# Patient Record
Sex: MALE | Race: WHITE | NOT HISPANIC OR LATINO | Employment: FULL TIME | URBAN - METROPOLITAN AREA
[De-identification: names, ages, dates, MRNs, and addresses within clinical notes are randomized per-mention and may not be internally consistent; named-entity substitution may affect disease eponyms.]

---

## 2019-08-27 ENCOUNTER — OFFICE VISIT (OUTPATIENT)
Dept: URGENT CARE | Facility: CLINIC | Age: 39
End: 2019-08-27
Payer: COMMERCIAL

## 2019-08-27 VITALS
DIASTOLIC BLOOD PRESSURE: 85 MMHG | WEIGHT: 236.6 LBS | BODY MASS INDEX: 29.42 KG/M2 | HEART RATE: 70 BPM | TEMPERATURE: 99.4 F | SYSTOLIC BLOOD PRESSURE: 122 MMHG | HEIGHT: 75 IN | OXYGEN SATURATION: 98 % | RESPIRATION RATE: 20 BRPM

## 2019-08-27 DIAGNOSIS — M25.519 ARTHRALGIA OF SHOULDER REGION, UNSPECIFIED LATERALITY: Primary | ICD-10-CM

## 2019-08-27 PROCEDURE — 99203 OFFICE O/P NEW LOW 30 MIN: CPT | Performed by: PREVENTIVE MEDICINE

## 2019-08-27 RX ORDER — RIZATRIPTAN BENZOATE 10 MG/1
10 TABLET, ORALLY DISINTEGRATING ORAL ONCE AS NEEDED
COMMUNITY

## 2019-08-27 NOTE — PROGRESS NOTES
[address]        NAME: Jenni Dunlap is a 46 y o  male  : 1980    MRN: [id number]  DATE: 2019  TIME: 6:15 PM    Assessment and Plan   Arthralgia of shoulder region, unspecified laterality [M25 [id number]]  1  Arthralgia of shoulder region, unspecified laterality           Patient Instructions       Follow up with PCP in 3-5 days  Proceed to  ER if symptoms worsen  Chief Complaint     Chief Complaint   Patient presents with    Back Pain     patient reports the last 2 weeks he has had bilateral shoulder, back and left elbow pain  Denies fever, chills or rash noted  reports he is fatigued  History of Present Illness       For the past 2 weeks he has had on and off bilateral shoulder pains some elbow pains particularly in the left elbow  Denies any other issues such as rash, cardiorespiratory  or GI symptomatology  Denies joint that are swollen red or warm  He denies fever  Past history of stage IV Hodgkin's which, by history, was cured in   Review of Systems   Review of Systems   Constitutional: Positive for fatigue  Respiratory: Negative  Cardiovascular: Negative  Gastrointestinal: Negative  Musculoskeletal: Positive for arthralgias  Negative for joint swelling  Current Medications       Current Outpatient Medications:     rizatriptan (MAXALT-MLT) 10 MG disintegrating tablet, Take 10 mg by mouth once as needed for migraine May repeat in 2 hours if needed, Disp: , Rfl:     Current Allergies     Allergies as of 2019    (No Known Allergies)            The following portions of the patient's history were reviewed and updated as appropriate: allergies, current medications, past family history, past medical history, past social history, past surgical history and problem list      Past Medical History:   Diagnosis Date    Hodgkin's disease with nodular sclerosis, stage IVB ([facility] )     Migraines        No past surgical history on file      No family history on file  Medications have been verified  Objective   /85   Pulse 70   Temp 99 4 °F (37 4 °C)   Resp 20   Ht 6' 3" (1 905 m)   Wt 107 kg (236 lb 9 6 oz)   SpO2 98%   BMI 29 57 kg/m²        Physical Exam     Physical Exam   HENT:   Right Ear: External ear normal    Left Ear: External ear normal    Mouth/Throat: Oropharynx is clear and moist    Eyes: Conjunctivae are normal  No scleral icterus  Neck: Normal range of motion  No thyromegaly present  Cardiovascular: Normal heart sounds  Pulmonary/Chest: Breath sounds normal    Abdominal: Soft  He exhibits no distension and no mass  There is no tenderness  No splenomegaly or hepatomegaly  Musculoskeletal: Normal range of motion  Lymphadenopathy:     He has no cervical adenopathy

## 2019-08-27 NOTE — PATIENT INSTRUCTIONS
At the present time I cannot find a cause for your joint aches  Perhaps at a viral illness  I would take Motrin for the joint aches  If you're not improved in 7-10 days I would see my family doctor for further workup

## 2021-03-09 ENCOUNTER — CONSULT (OUTPATIENT)
Dept: PAIN MEDICINE | Facility: CLINIC | Age: 41
End: 2021-03-09
Payer: COMMERCIAL

## 2021-03-09 VITALS
HEIGHT: 74 IN | HEART RATE: 70 BPM | WEIGHT: 230 LBS | DIASTOLIC BLOOD PRESSURE: 78 MMHG | BODY MASS INDEX: 29.52 KG/M2 | TEMPERATURE: 97 F | SYSTOLIC BLOOD PRESSURE: 128 MMHG

## 2021-03-09 DIAGNOSIS — M51.26 LUMBAR DISC HERNIATION: Primary | ICD-10-CM

## 2021-03-09 DIAGNOSIS — M54.16 LUMBAR RADICULOPATHY: ICD-10-CM

## 2021-03-09 PROBLEM — R53.82 CHRONIC FATIGUE SYNDROME: Status: ACTIVE | Noted: 2021-03-09

## 2021-03-09 PROBLEM — G43.909 MIGRAINE: Status: ACTIVE | Noted: 2021-03-09

## 2021-03-09 PROBLEM — F43.10 POSTTRAUMATIC STRESS DISORDER: Status: ACTIVE | Noted: 2021-03-09

## 2021-03-09 PROBLEM — G93.32 CHRONIC FATIGUE SYNDROME: Status: ACTIVE | Noted: 2021-03-09

## 2021-03-09 PROBLEM — Z85.71 HISTORY OF HODGKIN'S LYMPHOMA: Status: ACTIVE | Noted: 2021-03-09

## 2021-03-09 PROBLEM — J30.2 SEASONAL ALLERGIES: Status: ACTIVE | Noted: 2021-03-09

## 2021-03-09 PROCEDURE — 99244 OFF/OP CNSLTJ NEW/EST MOD 40: CPT | Performed by: ANESTHESIOLOGY

## 2021-03-09 RX ORDER — TIZANIDINE HYDROCHLORIDE 4 MG/1
4 CAPSULE, GELATIN COATED ORAL 3 TIMES DAILY
Qty: 45 CAPSULE | Refills: 0 | Status: SHIPPED | OUTPATIENT
Start: 2021-03-09
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
## 2021-03-09 NOTE — PATIENT INSTRUCTIONS
Epidural Steroid Injection   AMBULATORY CARE:   What you need to know about an epidural steroid injection (JAE):  An JAE is a procedure to inject steroid medicine into the epidural space  The epidural space is between your spinal cord and vertebrae  Steroids reduce inflammation and fluid buildup in your spine that may be causing pain  You may be given pain medicine along with the steroids  How to prepare for an JAE:  Your healthcare provider will talk to you about how to prepare for your procedure  He or she will tell you what medicines to take or not take on the day of your procedure  You may need to stop taking blood thinners or other medicines several days before your procedure  You may need to adjust any diabetes medicine you take on the day of your procedure  Steroid medicine can increase your blood sugar level  Arrange for someone to drive you home when you are discharged  What will happen during an JAE:   · You will be given medicine to numb the procedure area  You will be awake for the procedure, but you will not feel pain  You may also be given medicine to help you relax  Contrast liquid will be used to help your healthcare provider see the area better  Tell the healthcare provider if you have ever had an allergic reaction to contrast liquid  · Your healthcare provider may place the needle into your neck area, middle of your back, or tailbone area  He may inject the medicine next to the nerves that are causing your pain  He may instead inject the medicine into a larger area of the epidural space  This helps the medicine spread to more nerves  Your healthcare provider will use a fluoroscope to help guide the needle to the right place  A fluoroscope is a type of x-ray  After the procedure, a bandage will be placed over the injection site to prevent infection  What will happen after an JAE:  You will have a bandage over the injection site to prevent infection   Your healthcare provider will tell you when you can bathe and any activity guidelines  You will be able to go home  Risks of an JAE:  You may have temporary or permanent nerve damage or paralysis  You may have bleeding or develop a serious infection, such as meningitis (swelling of the brain coverings)  An abscess may also develop  An abscess is a pus-filled area under the skin  You may need surgery to fix the abscess  You may have a seizure, anxiety, or trouble sleeping  If you are a man, you may have temporary erectile dysfunction (not able to have an erection)  Call your local emergency number (911 in the [address] [place] [address],[address]) if:   · You have a seizure  · You have trouble moving your legs  Seek care immediately if:   · Blood soaks through your bandage  · You have a fever or chills, severe back pain, and the procedure area is sensitive to the touch  · You cannot control when you urinate or have a bowel movement  Call your doctor if:   · You have weakness or numbness in your legs  · Your wound is red, swollen, or draining pus  · You have nausea or are vomiting  · Your face or neck is red and you feel warm  · You have more pain than you had before the procedure  · You have swelling in your hands or feet  · You have questions or concerns about your condition or care  Care for your wound as directed: You may remove the bandage before you go to bed the day of your procedure  You may take a shower, but do not take a bath for at least 24 hours  Self-care:   · Do not drive,  use machines, or do strenuous activity for 24 hours after your procedure or as directed  · Continue other treatments  as directed  Steroid injections alone will not control your pain  The injections are meant to be used with other treatments, such as physical therapy  Follow up with your doctor as directed:  Write down your questions so you remember to ask them during your visits     © Copyright [employer] 2020 Information is for End User's use only and may not be sold, redistributed or otherwise used for commercial purposes  All illustrations and images included in CareNotes® are the copyrighted property of A D A M [employer] Inc  or [employer]lam[employer]  The above information is an  only  It is not intended as medical advice for individual conditions or treatments  Talk to your doctor, nurse or pharmacist before following any medical regimen to see if it is safe and effective for you

## 2021-03-12 ENCOUNTER — HOSPITAL ENCOUNTER (OUTPATIENT)
Dept: RADIOLOGY | Facility: CLINIC | Age: 41
Discharge: HOME | End: 2021-03-12
Attending: ANESTHESIOLOGY | Admitting: ANESTHESIOLOGY
Payer: COMMERCIAL

## 2021-03-12 VITALS
DIASTOLIC BLOOD PRESSURE: 76 MMHG | RESPIRATION RATE: 20 BRPM | OXYGEN SATURATION: 98 % | HEART RATE: 60 BPM | SYSTOLIC BLOOD PRESSURE: 118 MMHG | TEMPERATURE: 98.1 F

## 2021-03-12 DIAGNOSIS — M51.26 LUMBAR DISC HERNIATION: ICD-10-CM

## 2021-03-12 DIAGNOSIS — M54.16 LUMBAR RADICULOPATHY: ICD-10-CM

## 2021-03-12 PROCEDURE — 62323 NJX INTERLAMINAR LMBR/SAC: CPT | Performed by: ANESTHESIOLOGY

## 2021-03-12 RX ORDER — METHYLPREDNISOLONE ACETATE 80 MG/ML
160 INJECTION, SUSPENSION INTRA-ARTICULAR; INTRALESIONAL; INTRAMUSCULAR; PARENTERAL; SOFT TISSUE ONCE
Status: COMPLETED | OUTPATIENT
Start: 2021-03-12 | End: 2021-03-12

## 2021-03-12 RX ADMIN — IOHEXOL 1 ML: 300 INJECTION, SOLUTION INTRAVENOUS at 15:08

## 2021-03-12 RX ADMIN — METHYLPREDNISOLONE ACETATE 160 MG: 80 INJECTION, SUSPENSION INTRA-ARTICULAR; INTRALESIONAL; INTRAMUSCULAR; SOFT TISSUE at 15:09

## 2021-03-12 NOTE — H&P
History of Present Illness: The patient is a 37 y o  male who presents with complaints of  Low back and leg pain  Patient Active Problem List   Diagnosis    Bleomycin toxicity    Chronic fatigue syndrome    History of Hodgkin's lymphoma    Hodgkin lymphoma, nodular sclerosis (HCC)    Migraine    Posttraumatic stress disorder    Seasonal allergies    Lumbar disc herniation    Lumbar radiculopathy       Past Medical History:   Diagnosis Date    [facility]     Hodgkin's disease with nodular sclerosis, stage IVB (HCC)     Migraines     Partial hearing loss     [facility]traumatic [facility] injury) ([facility] )        No past surgical history on file  Current Outpatient Medications:     rizatriptan (MAXALT-MLT) 10 MG disintegrating tablet, Take 10 mg by mouth once as needed for migraine May repeat in 2 hours if needed, Disp: , Rfl:     TiZANidine (ZANAFLEX) 4 MG capsule, Take 1 capsule (4 mg total) by mouth 3 (three) times a day, Disp: 45 capsule, Rfl: 0    Current Facility-Administered Medications:     iohexol (OMNIPAQUE) 300 mg/mL injection 50 mL, 50 mL, Epidural, Once, Josué Wang DO    methylPREDNISolone acetate (DEPO-MEDROL) injection 160 mg, 160 mg, Epidural, Once, Josué Wang DO    No Known Allergies    Physical Exam:   General: Awake, Alert, Oriented x 3, Mood and affect appropriate  Respiratory: Respirations even and unlabored  Cardiovascular: Peripheral pulses intact; no edema  Musculoskeletal Exam:  Normal gait and station    ASA Score: II         Assessment:   1  Lumbar disc herniation    2   Lumbar radiculopathy        Plan: LESI to the right 1

## 2021-03-12 NOTE — DISCHARGE INSTRUCTIONS
Epidural Steroid Injection   WHAT YOU NEED TO KNOW:   An epidural steroid injection (JAE) is a procedure to inject steroid medicine into the epidural space  The epidural space is between your spinal cord and vertebrae  Steroids reduce inflammation and fluid buildup in your spine that may be causing pain  You may be given pain medicine along with the steroids  ACTIVITY  · Do not drive or operate machinery today  · No strenuous activity today - bending, lifting, etc   · You may resume normal activites starting tomorrow - start slowly and as tolerated  · You may shower today, but no tub baths or hot tubs  · You may have numbness for several hours from the local anesthetic  Please use caution and common sense, especially with weight-bearing activities  CARE OF THE INJECTION SITE  · If you have soreness or pain, apply ice to the area today (20 minutes on/20 minutes off)  · Starting tomorrow, you may use warm, moist heat or ice if needed  · You may have an increase or change in your discomfort for 36-48 hours after your treatment  · Apply ice and continue with any pain medication you have been prescribed  · [facility]ify [facility] if you have any of the following: redness, drainage, swelling, headache, stiff neck or fever above 100°F     SPECIAL INSTRUCTIONS  · Our office will contact you in approximately 7 days for a progress report  MEDICATIONS  · Continue to take all routine medications  · Our office may have instructed you to hold some medications  If you have a problem specifically related to your procedure, please call our office at [phone]  Problems not related to your procedure should be directed to your primary care physician

## 2021-03-19 ENCOUNTER — TELEPHONE (OUTPATIENT)
Dept: PAIN MEDICINE | Facility: CLINIC | Age: 41
End: 2021-03-19

## 2021-03-19 DIAGNOSIS — M51.26 LUMBAR DISC HERNIATION: ICD-10-CM

## 2021-03-19 DIAGNOSIS — M54.16 LUMBAR RADICULOPATHY: ICD-10-CM

## 2021-03-19 RX ORDER — TIZANIDINE HYDROCHLORIDE 4 MG/1
4 CAPSULE, GELATIN COATED ORAL 3 TIMES DAILY
Qty: 45 CAPSULE | Refills: 0 | OUTPATIENT
Start: 2021-03-19

## 2021-03-19 NOTE — TELEPHONE ENCOUNTER
Pt reports 50% improvement post inj   Pain level 4/10       S/p LESI to RT #1 3/12, 4/2 LESI #2     Patient would like a call to see when the next available is for his 2nd injection because he might need to r/s

## 2021-03-26 ENCOUNTER — HOSPITAL ENCOUNTER (OUTPATIENT)
Dept: RADIOLOGY | Facility: CLINIC | Age: 41
Discharge: HOME | End: 2021-03-26
Attending: ANESTHESIOLOGY | Admitting: ANESTHESIOLOGY
Payer: COMMERCIAL

## 2021-03-26 VITALS
SYSTOLIC BLOOD PRESSURE: 118 MMHG | DIASTOLIC BLOOD PRESSURE: 75 MMHG | HEART RATE: 69 BPM | OXYGEN SATURATION: 96 % | RESPIRATION RATE: 20 BRPM | TEMPERATURE: 97.3 F

## 2021-03-26 DIAGNOSIS — M54.16 LUMBAR RADICULOPATHY: ICD-10-CM

## 2021-03-26 DIAGNOSIS — M51.26 LUMBAR DISC HERNIATION: ICD-10-CM

## 2021-03-26 PROCEDURE — 62323 NJX INTERLAMINAR LMBR/SAC: CPT | Performed by: ANESTHESIOLOGY

## 2021-03-26 RX ORDER — METHYLPREDNISOLONE ACETATE 80 MG/ML
80 INJECTION, SUSPENSION INTRA-ARTICULAR; INTRALESIONAL; INTRAMUSCULAR; PARENTERAL; SOFT TISSUE ONCE
Status: COMPLETED | OUTPATIENT
Start: 2021-03-26 | End: 2021-03-26

## 2021-03-26 RX ADMIN — IOHEXOL 1 ML: 300 INJECTION, SOLUTION INTRAVENOUS at 14:21

## 2021-03-26 RX ADMIN — METHYLPREDNISOLONE ACETATE 80 MG: 80 INJECTION, SUSPENSION INTRA-ARTICULAR; INTRALESIONAL; INTRAMUSCULAR; SOFT TISSUE at 14:22

## 2021-03-26 NOTE — DISCHARGE INSTRUCTIONS
Epidural Steroid Injection   WHAT YOU NEED TO KNOW:   An epidural steroid injection (JAE) is a procedure to inject steroid medicine into the epidural space  The epidural space is between your spinal cord and vertebrae  Steroids reduce inflammation and fluid buildup in your spine that may be causing pain  You may be given pain medicine along with the steroids  ACTIVITY  · Do not drive or operate machinery today  · No strenuous activity today - bending, lifting, etc   · You may resume normal activites starting tomorrow - start slowly and as tolerated  · You may shower today, but no tub baths or hot tubs  · You may have numbness for several hours from the local anesthetic  Please use caution and common sense, especially with weight-bearing activities  CARE OF THE INJECTION SITE  · If you have soreness or pain, apply ice to the area today (20 minutes on/20 minutes off)  · Starting tomorrow, you may use warm, moist heat or ice if needed  · You may have an increase or change in your discomfort for 36-48 hours after your treatment  · Apply ice and continue with any pain medication you have been prescribed  · Notify the [facility] if you have any of the following: redness, drainage, swelling, headache, stiff neck or fever above 100°F     SPECIAL INSTRUCTIONS  · Our office will contact you in approximately 7 days for a progress report  MEDICATIONS  · Continue to take all routine medications  · Our office may have instructed you to hold some medications  As no general anesthesia was used in today's procedure, you should not experience any side effects related to anesthesia  If you have a problem specifically related to your procedure, please call our office at [phone]  Problems not related to your procedure should be directed to your primary care physician

## 2021-03-26 NOTE — H&P
History of Present Illness: The patient is a 37 y o  male who presents with complaints of   Low back and leg pain  Patient Active Problem List   Diagnosis    Bleomycin toxicity    Chronic fatigue syndrome    History of Hodgkin's lymphoma    Hodgkin lymphoma, nodular sclerosis (HCC)    Migraine    Posttraumatic stress disorder    Seasonal allergies    Lumbar disc herniation    Lumbar radiculopathy       Past Medical History:   Diagnosis Date    [facility]     Hodgkin's disease with nodular sclerosis, stage IVB (HCC)     Migraines     Partial hearing loss     [facility]traumatic [facility] injury) ([facility] )        No past surgical history on file  Current Outpatient Medications:     rizatriptan (MAXALT-MLT) 10 MG disintegrating tablet, Take 10 mg by mouth once as needed for migraine May repeat in 2 hours if needed, Disp: , Rfl:     TiZANidine (ZANAFLEX) 4 MG capsule, Take 1 capsule (4 mg total) by mouth 3 (three) times a day, Disp: 45 capsule, Rfl: 0    Current Facility-Administered Medications:     iohexol (OMNIPAQUE) 300 mg/mL injection 50 mL, 50 mL, Epidural, Once, Josué Sequeiraev, DO    methylPREDNISolone acetate (DEPO-MEDROL) injection 80 mg, 80 mg, Epidural, Once, Josué Wang, DO    No Known Allergies    Physical Exam:   Vitals:    03/26/21 1415   BP: 125/76   Pulse: 66   Resp: 20   Temp: (!) 97 3 °F (36 3 °C)   SpO2: 95%     General: Awake, Alert, Oriented x 3, Mood and affect appropriate  Respiratory: Respirations even and unlabored  Cardiovascular: Peripheral pulses intact; no edema  Musculoskeletal Exam:   Decreased range of motion lumbar spine    ASA Score: II    Patient/Chart Verification  Patient ID Verified: Verbal  ID Band Applied: No  Consents Confirmed: Procedural  H&P( within 30 days) Verified: To be obtained in the Pre-Procedure area  Interval H&P(within 24 hr) Complete (required for Outpatients and Surgery Admit only):  To be obtained in the Pre-Procedure area  Allergies Reviewed: Yes  Anticoag/NSAID held?: NA  Currently on antibiotics?: No    Assessment:   1  Lumbar disc herniation    2   Lumbar radiculopathy        Plan:  RAMOS

## 2021-04-02 ENCOUNTER — TELEPHONE (OUTPATIENT)
Dept: PAIN MEDICINE | Facility: CLINIC | Age: 41
End: 2021-04-02

## 2021-04-19 ENCOUNTER — TELEMEDICINE (OUTPATIENT)
Dept: PAIN MEDICINE | Facility: CLINIC | Age: 41
End: 2021-04-19
Payer: COMMERCIAL

## 2021-04-19 DIAGNOSIS — M54.16 LUMBAR RADICULOPATHY: Primary | ICD-10-CM

## 2021-04-19 DIAGNOSIS — M51.26 LUMBAR DISC HERNIATION: ICD-10-CM

## 2021-04-19 PROCEDURE — 99441 PR PHYS/QHP TELEPHONE EVALUATION 5-10 MIN: CPT | Performed by: NURSE PRACTITIONER

## 2021-04-19 NOTE — PATIENT INSTRUCTIONS
Assessment/Plan: 1  I discussed with the patient that since there has been moderate-significant improvement in the pain symptoms that we will hold off on any repeat injections at this point in time  However, I did explain that if over the next 8-12 weeks the pain symptoms should return, worsen, and/or change, we could consider a repeat injection  If after the 12 weeks and OV would be warranted for re-evaluation  The patient was agreeable and verbalized an understanding  2  With regards to chiropractic treatment, explained the patient at this point I feel would be reasonable to restart his chiropractic treatment and advised him to allow pain be his guide  I also advised the patient that he should continue to try to slowly and steadily increase his activity, as tolerated, again, allowing pain be his guide  The patient was agreeable and verbalized an understanding  3  I discussed with the patient that at this point time he can followup with our office on an as-needed basis  I did review the patient that if his pain symptoms should change, worsen, and/or if he would experience any new symptoms he would like to be evaluated for, he should give our office a call  The patient was agreeable and verbalized an understanding

## 2021-04-19 NOTE — PROGRESS NOTES
Virtual Brief Visit    Assessment/Plan: 1  I discussed with the patient that since there has been moderate-significant improvement in the pain symptoms that we will hold off on any repeat injections at this point in time  However, I did explain that if over the next 8-12 weeks the pain symptoms should return, worsen, and/or change, we could consider a repeat injection  If after the 12 weeks and OV would be warranted for re-evaluation  The patient was agreeable and verbalized an understanding  2  With regards to chiropractic treatment, explained the patient at this point I feel would be reasonable to restart his chiropractic treatment and advised him to allow pain be his guide  I also advised the patient that he should continue to try to slowly and steadily increase his activity, as tolerated, again, allowing pain be his guide  The patient was agreeable and verbalized an understanding  3  I discussed with the patient that at this point time he can followup with our office on an as-needed basis  I did review the patient that if his pain symptoms should change, worsen, and/or if he would experience any new symptoms he would like to be evaluated for, he should give our office a call  The patient was agreeable and verbalized an understanding  Problem List Items Addressed This Visit        Nervous and Auditory    Lumbar radiculopathy - Primary       Musculoskeletal and Integument    Lumbar disc herniation                Reason for visit is   Chief Complaint   Patient presents with    Virtual Brief Visit        Encounter provider JAMES Bull    Provider located at [address] [place] [address] E  [place] [address] [address]  [phone]    Recent Visits  No visits were found meeting these conditions     Showing recent visits within past 7 days and meeting all other requirements     Today's Visits  Date Type Provider Dept   04/19/21 Telemedicine JAMES Augustin  [facility]   Showing today's visits and meeting all other requirements     Future Appointments  No visits were found meeting these conditions  Showing future appointments within next 150 days and meeting all other requirements      It was my intent to perform this visit via video technology but the patient was not able to do a video connection so the visit was completed via audio telephone only  After connecting through telephone, the patient was identified by name and date of birth  Gena Weldon was informed that this is a telemedicine visit and that the visit is being conducted through telephone  My office door was closed  No one else was in the room  He acknowledged consent and understanding of privacy and security of the platform  The patient has agreed to participate and understands he can discontinue the visit at any time  Patient is aware this is a billable service  Subjective    Gena Weldon is a 37 y o  male who is currently concerned about the risks of COVID-19  The patient currently denies any of the signs or symptoms of COVID-19, but because of age or other comorbidities, medical history, and/or fear of exposure to COVID-19, the patient preferred to proceed with a virtual visit today  The patient is currently being treated for his low back and lumbar radiculopathy secondary to a disc herniation  He is status post his 2nd L5-S1 interlaminar lumbar epidural steroid injection on March 26, 2021 with Dr Barron Kessler  He reports that there is at least 75% overall improvement in his pain symptoms as he does have a chronic dull aching pain, however, the pain is minimal and manageable and even at the end of a busy work day his pain is not significantly flared up and even this past weekend he had to move some 80 lb bags of concrete and was able to do so without significant pain the next day    The patient reports that he tries to use his legs and lift properly to prevent any further issues or pain  He want to know if it would be okay to go back to his chiropractor to also help with maintenance and manage his pain is well  Presently he rates his pain as a 4/10  HPI     Past Medical History:   Diagnosis Date    Cancer ([facility] )     Hodgkin's disease with nodular sclerosis, stage IVB ([facility] )     Migraines     Partial hearing loss     TBI (traumatic brain injury) ([facility] )        No past surgical history on file  Current Outpatient Medications   Medication Sig Dispense Refill    rizatriptan (MAXALT-MLT) 10 MG disintegrating tablet Take 10 mg by mouth once as needed for migraine May repeat in 2 hours if needed      TiZANidine (ZANAFLEX) 4 MG capsule Take 1 capsule (4 mg total) by mouth 3 (three) times a day 45 capsule 0     No current facility-administered medications for this visit  No Known Allergies    Review of Systems   Musculoskeletal: Positive for back pain  All other systems reviewed and are negative  There were no vitals filed for this visit  I spent 7 minutes discussing the patients past medical/surgical history, current pain symptoms, and medication regiment/treatment plan with the patient today during the virtual visit  Normal OV: [id number]    VIRTUAL VISIT DISCLAIMER    Dario Adam acknowledges that he has consented to an online visit or consultation  He understands that the online visit is based solely on information provided by him, and that, in the absence of a face-to-face physical evaluation by the physician, the diagnosis he receives is both limited and provisional in terms of accuracy and completeness  This is not intended to replace a full medical face-to-face evaluation by the physician  Dario Adam understands and accepts these terms

## 2022-05-23 ENCOUNTER — CONSULT (OUTPATIENT)
Dept: SURGERY | Facility: HOSPITAL | Age: 42
End: 2022-05-23
Payer: COMMERCIAL

## 2022-05-23 VITALS
DIASTOLIC BLOOD PRESSURE: 81 MMHG | SYSTOLIC BLOOD PRESSURE: 122 MMHG | WEIGHT: 244.4 LBS | HEIGHT: 75 IN | TEMPERATURE: 97.7 F | HEART RATE: 59 BPM | BODY MASS INDEX: 30.39 KG/M2 | RESPIRATION RATE: 16 BRPM

## 2022-05-23 DIAGNOSIS — L72.9 SCALP CYST: Primary | ICD-10-CM

## 2022-05-23 PROCEDURE — 99203 OFFICE O/P NEW LOW 30 MIN: CPT | Performed by: SURGERY

## 2022-05-23 RX ORDER — IBUPROFEN AND FAMOTIDINE 26.6; 8 MG/1; MG/1
TABLET, FILM COATED ORAL 3 TIMES DAILY
COMMUNITY

## 2022-05-23 RX ORDER — FAMOTIDINE 10 MG/ML
INJECTION, SOLUTION INTRAVENOUS EVERY 12 HOURS
COMMUNITY
Start: 2022-02-21

## 2022-05-23 RX ORDER — TRAZODONE HYDROCHLORIDE 50 MG/1
TABLET ORAL
COMMUNITY

## 2022-05-27 NOTE — PROGRESS NOTES
Assessment/Plan:   Tara Egan is a 40 y o male who is here for Cyst (New patient referred by his PCP for evaluation of left scalp cyst  Has been there for some time  Has history of migraine headaches and is wondering if the cyst could be causing his discomfort  No infection of cyst at this time )    Plan: scalp cyst - discussed operative vs conservative mgt, surgical approaches, risks and benefits and patient has decided to proceed with excision of cyst given it is enlarging and painful  I will schedule at their earliest convenience  Preoperative Clearance: None    HPI:  Tara Egan is a 40 y o male who was referred for evaluation of Cyst (New patient referred by his PCP for evaluation of left scalp cyst  Has been there for some time  Has history of migraine headaches and is wondering if the cyst could be causing his discomfort  No infection of cyst at this time )    Currently enlarging and painful scalp cyst      ROS:  General ROS: negative  negative for - chills, fatigue, fever or night sweats, weight loss  Respiratory ROS: no cough, shortness of breath, or wheezing  Cardiovascular ROS: no chest pain or dyspnea on exertion  Genito-Urinary ROS: no dysuria, trouble voiding, or hematuria  Musculoskeletal ROS: negative for - gait disturbance, joint pain or muscle pain  Neurological ROS: no TIA or stroke symptoms  Scalp cyst painful and enlarging    [unfilled]  Patient has no known allergies      Current Outpatient Medications:     rizatriptan (MAXALT-MLT) 10 MG disintegrating tablet, Take 10 mg by mouth once as needed for migraine May repeat in 2 hours if needed, Disp: , Rfl:     Famotidine, PF, (PEPCID) 20 mg/2 mL SOLN, Every 12 hours (Patient not taking: Reported on 5/23/2022), Disp: , Rfl:     Ibuprofen 800 MG/200ML SOLN, 3 (three) times a day (Patient not taking: Reported on 5/23/2022), Disp: , Rfl:     Ibuprofen-Famotidine 800-26 6 MG TABS, 3 (three) times a day (Patient not taking: Reported on 5/23/2022), Disp: , Rfl:     TiZANidine (ZANAFLEX) 4 MG capsule, Take 1 capsule (4 mg total) by mouth 3 (three) times a day (Patient not taking: Reported on 5/23/2022), Disp: 45 capsule, Rfl: 0    traZODone (DESYREL) 50 mg tablet, TAKE 1 TABLET BY MOUTH EVERY DAY AT BEDTIME AS NEEDED FOR 30 DAYS (Patient not taking: Reported on 5/23/2022), Disp: , Rfl:   Past Medical History:   Diagnosis Date    [facility] )     Hodgkin's disease with nodular sclerosis, stage IVB (HCC)     Migraines     Partial hearing loss     [facility] )      No past surgical history on file  No family history on file  reports that he has quit smoking  He has never used smokeless tobacco     Labs:   No results found for: WBC, HGB, PLT  No results found for: ALT, AST  This SmartLink has not been configured with any valid records  PHYSICAL EXAM  General Appearance:    Alert, cooperative, no distress,    Head:    Normocephalic without obvious abnormality   Eyes:    PERRL, conjunctiva/corneas clear, EOM's intact        Neck:   Supple, no adenopathy, no JVD   Back:     Symmetric, no spinal or CVA tenderness   Lungs:     Clear to auscultation bilaterally, no wheezing or rhonchi   Heart:    Regular rate and rhythm, S1 and S2 normal, no murmur   Abdomen:     Soft +BS ND NT   Extremities:   Extremities normal  No clubbing, cyanosis or edema   Psych:   Normal Affect, AOx3  Neurologic:  Skin:   CNII-XII intact  Strength symmetric, speech intact    Warm, dry, intact, + scalp cyst         Physical Exam              Some portions of this record may have been generated with voice recognition software  There may be translation, syntax,  or grammatical errors  Occasional wrong word or "sound-a-like" substitutions may have occurred due to the inherent limitations of the voice recognition software  Read the chart carefully and recognize, using context, where substitutions may have occurred   If you have any questions, please contact the dictating provider for clarification or correction, as needed  This encounter has been coded by a non-certified coder

## 2022-06-16 ENCOUNTER — PROCEDURE VISIT (OUTPATIENT)
Dept: SURGERY | Facility: HOSPITAL | Age: 42
End: 2022-06-16
Payer: COMMERCIAL

## 2022-06-16 VITALS
DIASTOLIC BLOOD PRESSURE: 94 MMHG | SYSTOLIC BLOOD PRESSURE: 135 MMHG | HEIGHT: 75 IN | TEMPERATURE: 97.2 F | HEART RATE: 78 BPM | WEIGHT: 246.6 LBS | BODY MASS INDEX: 30.66 KG/M2

## 2022-06-16 DIAGNOSIS — L72.9 SCALP CYST: Primary | ICD-10-CM

## 2022-06-16 PROCEDURE — 99213 OFFICE O/P EST LOW 20 MIN: CPT | Performed by: SURGERY

## 2022-06-16 PROCEDURE — 11420 EXC H-F-NK-SP B9+MARG 0.5/<: CPT | Performed by: SURGERY

## 2022-06-20 LAB
PATH REPORT.SITE OF ORIGIN SPEC: NORMAL
PAYMENT PROCEDURE: NORMAL
SL AMB .: NORMAL

## 2022-06-30 NOTE — PROGRESS NOTES
Assessment/Plan:   Alethea Jeans is a 40 y o male who is here for Procedure (Established patient who returns today to have excision of scalp cyst done in the office today )    Plan: Scalp cyst - excised in the office today with no issues, wound instructions given, specimen sent for pathology    Preoperative Clearance: None    HPI:  Alethea Jeans is a 40 y o male who was referred for evaluation of Procedure (Established patient who returns today to have excision of scalp cyst done in the office today )    Currently pain from cyst      ROS:  General ROS: negative  negative for - chills, fatigue, fever or night sweats, weight loss  Respiratory ROS: no cough, shortness of breath, or wheezing  Cardiovascular ROS: no chest pain or dyspnea on exertion  Genito-Urinary ROS: no dysuria, trouble voiding, or hematuria  Musculoskeletal ROS: negative for - gait disturbance, joint pain or muscle pain  Neurological ROS: no TIA or stroke symptoms  Scalp mass    [unfilled]  Patient has no known allergies      Current Outpatient Medications:     Famotidine, PF, (PEPCID) 20 mg/2 mL SOLN, Every 12 hours (Patient not taking: Reported on 5/23/2022), Disp: , Rfl:     Ibuprofen 800 MG/200ML SOLN, 3 (three) times a day (Patient not taking: Reported on 5/23/2022), Disp: , Rfl:     Ibuprofen-Famotidine 800-26 6 MG TABS, 3 (three) times a day (Patient not taking: Reported on 5/23/2022), Disp: , Rfl:     rizatriptan (MAXALT-MLT) 10 MG disintegrating tablet, Take 10 mg by mouth once as needed for migraine May repeat in 2 hours if needed, Disp: , Rfl:     TiZANidine (ZANAFLEX) 4 MG capsule, Take 1 capsule (4 mg total) by mouth 3 (three) times a day (Patient not taking: Reported on 5/23/2022), Disp: 45 capsule, Rfl: 0    traZODone (DESYREL) 50 mg tablet, TAKE 1 TABLET BY MOUTH EVERY DAY AT BEDTIME AS NEEDED FOR 30 DAYS (Patient not taking: Reported on 5/23/2022), Disp: , Rfl:   Past Medical History:   Diagnosis Date    Cancer ([facility] )     Hodgkin's disease with nodular sclerosis, stage IVB ([facility] )     Migraines     Partial hearing loss     TBI (traumatic brain injury) ([facility] )      History reviewed  No pertinent surgical history  History reviewed  No pertinent family history  reports that he has quit smoking  He has never used smokeless tobacco     Labs:   No results found for: WBC, HGB, PLT  No results found for: ALT, AST  This SmartLink has not been configured with any valid records  PHYSICAL EXAM  General Appearance:    Alert, cooperative, no distress,    Head:    Normocephalic without obvious abnormality   Eyes:    PERRL, conjunctiva/corneas clear, EOM's intact        Neck:   Supple, no adenopathy, no JVD   Back:     Symmetric, no spinal or CVA tenderness   Lungs:     Clear to auscultation bilaterally, no wheezing or rhonchi   Heart:    Regular rate and rhythm, S1 and S2 normal, no murmur   Abdomen:     Soft +BS ND NT   Extremities:   Extremities normal  No clubbing, cyanosis or edema   Psych:   Normal Affect, AOx3  Neurologic:  Skin:   CNII-XII intact  Strength symmetric, speech intact    Warm, dry, intact, no visible rashes or lesions         Physical Exam    Skin excision    Date/Time: 6/16/2022 1:08 PM  Performed by: Lucius Caro MD  Authorized by: Lucius Caro MD   Universal Protocol:  Consent: Verbal consent obtained  Written consent obtained  Risks and benefits: risks, benefits and alternatives were discussed  Consent given by: patient  Patient identity confirmed: verbally with patient      Procedure Details - Skin Excision:     Number of Lesions:  1  Lesion 1:     Body area:  Head/neck    Head/neck location:  Scalp    Skin lesion 1 size (mm): [id number]  Final defect size (mm): 1 5x1 25x1 25cm      Malignancy: benign lesion      Destruction method: scissors used for extraction      Repair type:  Linear closure    Closure complexity: intermediate       Repair Comments: Wound closed with 3-0 vicryl suture for subq and 4-0 monocryl subcuticular for skin 2mm margin              Some portions of this record may have been generated with voice recognition software  There may be translation, syntax,  or grammatical errors  Occasional wrong word or "sound-a-like" substitutions may have occurred due to the inherent limitations of the voice recognition software  Read the chart carefully and recognize, using context, where substitutions may have occurred  If you have any questions, please contact the dictating provider for clarification or correction, as needed  This encounter has been coded by a non-certified coder

## 2024-04-25 ENCOUNTER — PROCEDURE VISIT (OUTPATIENT)
Dept: SURGERY | Facility: HOSPITAL | Age: 44
End: 2024-04-25
Payer: COMMERCIAL

## 2024-04-25 VITALS
BODY MASS INDEX: 31.73 KG/M2 | SYSTOLIC BLOOD PRESSURE: 125 MMHG | DIASTOLIC BLOOD PRESSURE: 83 MMHG | HEART RATE: 80 BPM | WEIGHT: 255.2 LBS | HEIGHT: 75 IN

## 2024-04-25 DIAGNOSIS — L72.3 INFECTED SEBACEOUS CYST: Primary | ICD-10-CM

## 2024-04-25 DIAGNOSIS — L08.9 INFECTED SEBACEOUS CYST: Primary | ICD-10-CM

## 2024-04-25 PROCEDURE — 99213 OFFICE O/P EST LOW 20 MIN: CPT | Performed by: SURGERY

## 2024-04-25 NOTE — PROGRESS NOTES
Assessment/Plan:   Zeferino Allen is a 44 y.o.male who is here for Procedure (PROCEDURE/ EXCISION OF CYST ON BACK//PT is here for a procedure for an excision of a cyst in the back. PT has had the cyst for a few years, it has recently became painful to the touch. He has also noticed it is more raised and it is hard to the touch.)  .  Infected epidermal cyst    Plan:   -Given the fact that the epidermal cyst appears infected with enlargement since previous visit, will recommend Augmentin for 5 days and reassessment.  If the area appears to have shrunk insufficiently enough to perform an office procedure we will proceed with this at that time, however given the size and infected nature of the epidermal inclusion cyst would recommend holding off on any in office procedures.  - Should the area not shrink in size after antibiotics would recommend excision under sedation given the size of the incision of the needed and possible to subcutaneous flap creation.  - Patient agrees to plan.  - Augmentin will be sent to patient's pharmacy.  - Patient follow-up after completion of antibiotic course.        HPI:  Zeferino Allen is a 44 y.o.male who was referred for evaluation of Procedure (PROCEDURE/ EXCISION OF CYST ON BACK//PT is here for a procedure for an excision of a cyst in the back. PT has had the cyst for a few years, it has recently became painful to the touch. He has also noticed it is more raised and it is hard to the touch.)  .    Currently: The cyst appears to be slightly enlarged and painful to touch.    ROS:  General ROS: negative  negative for - chills, fatigue, fever or night sweats, weight loss  Respiratory ROS: no cough, shortness of breath, or wheezing  Cardiovascular ROS: no chest pain or dyspnea on exertion  Genito-Urinary ROS: no dysuria, trouble voiding, or hematuria  Musculoskeletal ROS: negative for - gait disturbance, joint pain or muscle pain  Neurological ROS: no TIA or stroke  "symptoms      [unfilled]  Patient has no known allergies.    Current Outpatient Medications:     rizatriptan (MAXALT-MLT) 10 MG disintegrating tablet, Take 10 mg by mouth once as needed for migraine May repeat in 2 hours if needed, Disp: , Rfl:     Famotidine, PF, (PEPCID) 20 mg/2 mL SOLN, Every 12 hours (Patient not taking: Reported on 5/23/2022), Disp: , Rfl:     Ibuprofen 800 MG/200ML SOLN, 3 (three) times a day (Patient not taking: Reported on 5/23/2022), Disp: , Rfl:     Ibuprofen-Famotidine 800-26.6 MG TABS, 3 (three) times a day (Patient not taking: Reported on 5/23/2022), Disp: , Rfl:     TiZANidine (ZANAFLEX) 4 MG capsule, Take 1 capsule (4 mg total) by mouth 3 (three) times a day (Patient not taking: Reported on 5/23/2022), Disp: 45 capsule, Rfl: 0    traZODone (DESYREL) 50 mg tablet, TAKE 1 TABLET BY MOUTH EVERY DAY AT BEDTIME AS NEEDED FOR 30 DAYS (Patient not taking: Reported on 5/23/2022), Disp: , Rfl:   Past Medical History:   Diagnosis Date    Cancer (HCC)     Hodgkin's disease with nodular sclerosis, stage IVB (HCC)     Migraines     Partial hearing loss     [facility] (traumatic brain injury) ([facility])      History reviewed. No pertinent surgical history.  History reviewed. No pertinent family history.   reports that he has quit smoking. He has never used smokeless tobacco. He reports current alcohol use. He reports that he does not use drugs.    Labs:   No results found for: \"WBC\", \"HGB\", \"PLT\"  Lab Results   Component Value Date    ALT 75 (H) 09/20/2022    AST 29 09/20/2022     This SmartLink has not been configured with any valid records.       PHYSICAL EXAM  General Appearance:    Alert, cooperative, no distres   Head:    Normocephalic without obvious abnormality   Eyes:    PERRL, conjunctiva/corneas clear, EOM's intact        Neck:   Supple, no adenopathy, no JVD   Back:     Symmetric, no spinal or CVA tenderness   Lungs:     Clear to auscultation bilaterally, no wheezing or rhonchi   Heart:    Regular " "rate and rhythm, S1 and S2 normal, no murmur   Abdomen:     Soft nontender   Extremities:   Extremities normal. No clubbing, cyanosis or edema   Psych:   Normal Affect, AOx3.    Neurologic:  Skin:   CNII-XII intact. Strength symmetric, speech intact  Midline on the back around T10 there is a 2 cm x 2 cm soft nodule that is not mobile.  Slight tenderness to palpation.  Mild erythema.  No active drainage.         Physical Exam              Some portions of this record may have been generated with voice recognition software. There may be translation, syntax,  or grammatical errors. Occasional wrong word or \"sound-a-like\" substitutions may have occurred due to the inherent limitations of the voice recognition software. Read the chart carefully and recognize, using context, where substitutions may have occurred. If you have any questions, please contact the dictating provider for clarification or correction, as needed. This encounter has been coded by a non-certified coder.   "

## 2024-04-29 ENCOUNTER — TELEPHONE (OUTPATIENT)
Age: 44
End: 2024-04-29

## 2024-04-29 DIAGNOSIS — L72.3 INFECTED SEBACEOUS CYST: Primary | ICD-10-CM

## 2024-04-29 DIAGNOSIS — L08.9 INFECTED SEBACEOUS CYST: Primary | ICD-10-CM

## 2024-04-29 RX ORDER — AMOXICILLIN AND CLAVULANATE POTASSIUM 875; 125 MG/1; MG/1
1 TABLET, FILM COATED ORAL EVERY 12 HOURS SCHEDULED
Qty: 14 TABLET | Refills: 0 | Status: SHIPPED | OUTPATIENT
Start: 2024-04-29 | End: 2024-05-06

## 2024-04-29 NOTE — TELEPHONE ENCOUNTER
Pt was see on 4/25 and was told Dr Farmer was sending a script for Augmentin in for him.  The script was not sent.  I told the pt that I would request it be sent.  He asked if there could be a rush on it since it's been a few days and he is in pain.  I told him I would send it as high priority.  I also confirmed he wants it sent to [facility] on [address] in [place]

## 2025-01-12 ENCOUNTER — HOSPITAL ENCOUNTER (EMERGENCY)
Facility: HOSPITAL | Age: 45
Discharge: LEFT BEFORE DISCHARGE | End: 2025-01-12
Payer: COMMERCIAL

## 2025-01-12 ENCOUNTER — APPOINTMENT (OUTPATIENT)
Dept: RADIOLOGY | Facility: HOSPITAL | Age: 45
End: 2025-01-12
Payer: COMMERCIAL

## 2025-01-12 VITALS
BODY MASS INDEX: 28.6 KG/M2 | WEIGHT: 230 LBS | SYSTOLIC BLOOD PRESSURE: 137 MMHG | OXYGEN SATURATION: 96 % | DIASTOLIC BLOOD PRESSURE: 91 MMHG | HEIGHT: 75 IN | TEMPERATURE: 97.9 F | HEART RATE: 89 BPM | RESPIRATION RATE: 18 BRPM

## 2025-01-12 LAB
ABO GROUP BLD: NORMAL
ALBUMIN SERPL BCG-MCNC: 4.5 G/DL (ref 3.5–5)
ALP SERPL-CCNC: 74 U/L (ref 34–104)
ALT SERPL W P-5'-P-CCNC: 30 U/L (ref 7–52)
ANION GAP SERPL CALCULATED.3IONS-SCNC: 7 MMOL/L (ref 4–13)
AST SERPL W P-5'-P-CCNC: 17 U/L (ref 13–39)
BASOPHILS # BLD AUTO: 0.02 THOUSANDS/ΜL (ref 0–0.1)
BASOPHILS NFR BLD AUTO: 0 % (ref 0–1)
BILIRUB SERPL-MCNC: 0.59 MG/DL (ref 0.2–1)
BLD GP AB SCN SERPL QL: NEGATIVE
BUN SERPL-MCNC: 16 MG/DL (ref 5–25)
CALCIUM SERPL-MCNC: 9.2 MG/DL (ref 8.4–10.2)
CHLORIDE SERPL-SCNC: 108 MMOL/L (ref 96–108)
CO2 SERPL-SCNC: 24 MMOL/L (ref 21–32)
CREAT SERPL-MCNC: 0.99 MG/DL (ref 0.6–1.3)
EOSINOPHIL # BLD AUTO: 0.08 THOUSAND/ΜL (ref 0–0.61)
EOSINOPHIL NFR BLD AUTO: 1 % (ref 0–6)
ERYTHROCYTE [DISTWIDTH] IN BLOOD BY AUTOMATED COUNT: 12.1 % (ref 11.6–15.1)
GFR SERPL CREATININE-BSD FRML MDRD: 92 ML/MIN/1.73SQ M
GLUCOSE SERPL-MCNC: 98 MG/DL (ref 65–140)
HCT VFR BLD AUTO: 46.9 % (ref 36.5–49.3)
HGB BLD-MCNC: 15.8 G/DL (ref 12–17)
IMM GRANULOCYTES # BLD AUTO: 0.04 THOUSAND/UL (ref 0–0.2)
IMM GRANULOCYTES NFR BLD AUTO: 0 % (ref 0–2)
LIPASE SERPL-CCNC: 24 U/L (ref 11–82)
LYMPHOCYTES # BLD AUTO: 1.41 THOUSANDS/ΜL (ref 0.6–4.47)
LYMPHOCYTES NFR BLD AUTO: 14 % (ref 14–44)
MCH RBC QN AUTO: 28.1 PG (ref 26.8–34.3)
MCHC RBC AUTO-ENTMCNC: 33.7 G/DL (ref 31.4–37.4)
MCV RBC AUTO: 84 FL (ref 82–98)
MONOCYTES # BLD AUTO: 0.55 THOUSAND/ΜL (ref 0.17–1.22)
MONOCYTES NFR BLD AUTO: 6 % (ref 4–12)
NEUTROPHILS # BLD AUTO: 7.88 THOUSANDS/ΜL (ref 1.85–7.62)
NEUTS SEG NFR BLD AUTO: 79 % (ref 43–75)
NRBC BLD AUTO-RTO: 0 /100 WBCS
PLATELET # BLD AUTO: 201 THOUSANDS/UL (ref 149–390)
PMV BLD AUTO: 10.1 FL (ref 8.9–12.7)
POTASSIUM SERPL-SCNC: 4.2 MMOL/L (ref 3.5–5.3)
PROT SERPL-MCNC: 7.1 G/DL (ref 6.4–8.4)
RBC # BLD AUTO: 5.62 MILLION/UL (ref 3.88–5.62)
RH BLD: POSITIVE
SODIUM SERPL-SCNC: 139 MMOL/L (ref 135–147)
SPECIMEN EXPIRATION DATE: NORMAL
WBC # BLD AUTO: 9.98 THOUSAND/UL (ref 4.31–10.16)

## 2025-01-12 PROCEDURE — 86900 BLOOD TYPING SEROLOGIC ABO: CPT | Performed by: EMERGENCY MEDICINE

## 2025-01-12 PROCEDURE — 83690 ASSAY OF LIPASE: CPT | Performed by: EMERGENCY MEDICINE

## 2025-01-12 PROCEDURE — 74018 RADEX ABDOMEN 1 VIEW: CPT

## 2025-01-12 PROCEDURE — 86901 BLOOD TYPING SEROLOGIC RH(D): CPT | Performed by: EMERGENCY MEDICINE

## 2025-01-12 PROCEDURE — 85025 COMPLETE CBC W/AUTO DIFF WBC: CPT | Performed by: EMERGENCY MEDICINE

## 2025-01-12 PROCEDURE — 36415 COLL VENOUS BLD VENIPUNCTURE: CPT | Performed by: EMERGENCY MEDICINE

## 2025-01-12 PROCEDURE — 80053 COMPREHEN METABOLIC PANEL: CPT | Performed by: EMERGENCY MEDICINE

## 2025-01-12 PROCEDURE — 86850 RBC ANTIBODY SCREEN: CPT | Performed by: EMERGENCY MEDICINE

## 2025-04-24 ENCOUNTER — APPOINTMENT (EMERGENCY)
Dept: CT IMAGING | Facility: HOSPITAL | Age: 45
End: 2025-04-24
Payer: COMMERCIAL

## 2025-04-24 ENCOUNTER — HOSPITAL ENCOUNTER (EMERGENCY)
Facility: HOSPITAL | Age: 45
Discharge: HOME | End: 2025-04-25
Attending: EMERGENCY MEDICINE
Payer: COMMERCIAL

## 2025-04-24 VITALS
DIASTOLIC BLOOD PRESSURE: 105 MMHG | WEIGHT: 230 LBS | OXYGEN SATURATION: 99 % | TEMPERATURE: 97.4 F | BODY MASS INDEX: 29.52 KG/M2 | RESPIRATION RATE: 22 BRPM | HEIGHT: 74 IN | HEART RATE: 56 BPM | SYSTOLIC BLOOD PRESSURE: 184 MMHG

## 2025-04-24 DIAGNOSIS — R10.9 ABDOMINAL PAIN: Primary | ICD-10-CM

## 2025-04-24 LAB
ALBUMIN SERPL BCG-MCNC: 4.9 G/DL (ref 3.5–5)
ALP SERPL-CCNC: 84 U/L (ref 34–104)
ALT SERPL W P-5'-P-CCNC: 31 U/L (ref 7–52)
ANION GAP SERPL CALCULATED.3IONS-SCNC: 8 MMOL/L (ref 4–13)
AST SERPL W P-5'-P-CCNC: 17 U/L (ref 13–39)
ATRIAL RATE: 288 BPM
ATRIAL RATE: 48 BPM
ATRIAL RATE: 52 BPM
BASOPHILS # BLD AUTO: 0.02 THOUSANDS/ÂΜL (ref 0–0.1)
BASOPHILS NFR BLD AUTO: 0 % (ref 0–1)
BILIRUB SERPL-MCNC: 0.55 MG/DL (ref 0.2–1)
BUN SERPL-MCNC: 16 MG/DL (ref 5–25)
CALCIUM SERPL-MCNC: 9.8 MG/DL (ref 8.4–10.2)
CARDIAC TROPONIN I PNL SERPL HS: 3 NG/L (ref ?–50)
CHLORIDE SERPL-SCNC: 103 MMOL/L (ref 96–108)
CO2 SERPL-SCNC: 27 MMOL/L (ref 21–32)
CREAT SERPL-MCNC: 0.97 MG/DL (ref 0.6–1.3)
EOSINOPHIL # BLD AUTO: 0.14 THOUSAND/ÂΜL (ref 0–0.61)
EOSINOPHIL NFR BLD AUTO: 2 % (ref 0–6)
ERYTHROCYTE [DISTWIDTH] IN BLOOD BY AUTOMATED COUNT: 12.1 % (ref 11.6–15.1)
GFR SERPL CREATININE-BSD FRML MDRD: 94 ML/MIN/1.73SQ M
GLUCOSE SERPL-MCNC: 109 MG/DL (ref 65–140)
HCT VFR BLD AUTO: 46.7 % (ref 36.5–49.3)
HGB BLD-MCNC: 15.8 G/DL (ref 12–17)
IMM GRANULOCYTES # BLD AUTO: 0.03 THOUSAND/UL (ref 0–0.2)
IMM GRANULOCYTES NFR BLD AUTO: 1 % (ref 0–2)
LIPASE SERPL-CCNC: 43 U/L (ref 11–82)
LYMPHOCYTES # BLD AUTO: 2.51 THOUSANDS/ÂΜL (ref 0.6–4.47)
LYMPHOCYTES NFR BLD AUTO: 38 % (ref 14–44)
MCH RBC QN AUTO: 27.9 PG (ref 26.8–34.3)
MCHC RBC AUTO-ENTMCNC: 33.8 G/DL (ref 31.4–37.4)
MCV RBC AUTO: 83 FL (ref 82–98)
MONOCYTES # BLD AUTO: 0.47 THOUSAND/ÂΜL (ref 0.17–1.22)
MONOCYTES NFR BLD AUTO: 7 % (ref 4–12)
NEUTROPHILS # BLD AUTO: 3.43 THOUSANDS/ÂΜL (ref 1.85–7.62)
NEUTS SEG NFR BLD AUTO: 52 % (ref 43–75)
NRBC BLD AUTO-RTO: 0 /100 WBCS
P AXIS: 40 DEGREES
PLATELET # BLD AUTO: 244 THOUSANDS/UL (ref 149–390)
PMV BLD AUTO: 9.8 FL (ref 8.9–12.7)
POTASSIUM SERPL-SCNC: 3.9 MMOL/L (ref 3.5–5.3)
PR INTERVAL: 194 MS
PROT SERPL-MCNC: 7.5 G/DL (ref 6.4–8.4)
QRS AXIS: 45 DEGREES
QRS AXIS: 55 DEGREES
QRS AXIS: 6 DEGREES
QRSD INTERVAL: 100 MS
QRSD INTERVAL: 100 MS
QRSD INTERVAL: 92 MS
QT INTERVAL: 392 MS
QT INTERVAL: 430 MS
QT INTERVAL: 440 MS
QTC INTERVAL: 380 MS
QTC INTERVAL: 393 MS
QTC INTERVAL: 435 MS
RBC # BLD AUTO: 5.66 MILLION/UL (ref 3.88–5.62)
SODIUM SERPL-SCNC: 138 MMOL/L (ref 135–147)
T WAVE AXIS: 34 DEGREES
T WAVE AXIS: 53 DEGREES
T WAVE AXIS: 60 DEGREES
VENTRICULAR RATE: 47 BPM
VENTRICULAR RATE: 48 BPM
VENTRICULAR RATE: 74 BPM
WBC # BLD AUTO: 6.6 THOUSAND/UL (ref 4.31–10.16)

## 2025-04-24 PROCEDURE — 96374 THER/PROPH/DIAG INJ IV PUSH: CPT

## 2025-04-24 PROCEDURE — 99285 EMERGENCY DEPT VISIT HI MDM: CPT | Performed by: EMERGENCY MEDICINE

## 2025-04-24 PROCEDURE — 93005 ELECTROCARDIOGRAM TRACING: CPT

## 2025-04-24 PROCEDURE — 96361 HYDRATE IV INFUSION ADD-ON: CPT

## 2025-04-24 PROCEDURE — 85025 COMPLETE CBC W/AUTO DIFF WBC: CPT | Performed by: EMERGENCY MEDICINE

## 2025-04-24 PROCEDURE — 84484 ASSAY OF TROPONIN QUANT: CPT | Performed by: EMERGENCY MEDICINE

## 2025-04-24 PROCEDURE — 74177 CT ABD & PELVIS W/CONTRAST: CPT

## 2025-04-24 PROCEDURE — 80053 COMPREHEN METABOLIC PANEL: CPT | Performed by: EMERGENCY MEDICINE

## 2025-04-24 PROCEDURE — 83690 ASSAY OF LIPASE: CPT | Performed by: EMERGENCY MEDICINE

## 2025-04-24 PROCEDURE — 99284 EMERGENCY DEPT VISIT MOD MDM: CPT

## 2025-04-24 PROCEDURE — 36415 COLL VENOUS BLD VENIPUNCTURE: CPT | Performed by: EMERGENCY MEDICINE

## 2025-04-24 RX ORDER — HYDROMORPHONE HCL/PF 1 MG/ML
1 SYRINGE (ML) INJECTION ONCE
Status: COMPLETED | OUTPATIENT
Start: 2025-04-24 | End: 2025-04-24

## 2025-04-24 RX ADMIN — IOHEXOL 100 ML: 350 INJECTION, SOLUTION INTRAVENOUS at 23:23

## 2025-04-24 RX ADMIN — SODIUM CHLORIDE 1000 ML: 0.9 INJECTION, SOLUTION INTRAVENOUS at 22:04

## 2025-04-24 RX ADMIN — HYDROMORPHONE HYDROCHLORIDE 1 MG: 1 INJECTION, SOLUTION INTRAMUSCULAR; INTRAVENOUS; SUBCUTANEOUS at 22:04

## 2025-04-25 LAB
2HR DELTA HS TROPONIN: 1 NG/L
BACTERIA UR QL AUTO: NORMAL /HPF
BILIRUB UR QL STRIP: NEGATIVE
CARDIAC TROPONIN I PNL SERPL HS: 4 NG/L (ref ?–50)
CLARITY UR: CLEAR
COLOR UR: YELLOW
GLUCOSE UR STRIP-MCNC: NEGATIVE MG/DL
HGB UR QL STRIP.AUTO: NEGATIVE
KETONES UR STRIP-MCNC: NEGATIVE MG/DL
LEUKOCYTE ESTERASE UR QL STRIP: NEGATIVE
NITRITE UR QL STRIP: NEGATIVE
NON-SQ EPI CELLS URNS QL MICRO: NORMAL /HPF
PH UR STRIP.AUTO: 6 [PH]
PROT UR STRIP-MCNC: ABNORMAL MG/DL
RBC #/AREA URNS AUTO: NORMAL /HPF
SP GR UR STRIP.AUTO: 1.01 (ref 1–1.03)
UROBILINOGEN UR STRIP-ACNC: <2 MG/DL
WBC #/AREA URNS AUTO: NORMAL /HPF

## 2025-04-25 PROCEDURE — 81001 URINALYSIS AUTO W/SCOPE: CPT | Performed by: EMERGENCY MEDICINE

## 2025-04-25 PROCEDURE — 84484 ASSAY OF TROPONIN QUANT: CPT | Performed by: EMERGENCY MEDICINE

## 2025-04-25 PROCEDURE — 96376 TX/PRO/DX INJ SAME DRUG ADON: CPT

## 2025-04-25 PROCEDURE — 36415 COLL VENOUS BLD VENIPUNCTURE: CPT | Performed by: EMERGENCY MEDICINE

## 2025-04-25 RX ORDER — HYDROMORPHONE HCL/PF 1 MG/ML
1 SYRINGE (ML) INJECTION ONCE
Refills: 0 | Status: COMPLETED | OUTPATIENT
Start: 2025-04-25 | End: 2025-04-25

## 2025-04-25 RX ADMIN — HYDROMORPHONE HYDROCHLORIDE 1 MG: 1 INJECTION, SOLUTION INTRAMUSCULAR; INTRAVENOUS; SUBCUTANEOUS at 00:08

## 2025-04-25 NOTE — ED TRIAGE NOTES
PT with mid abd pain x1.5 days, was eval at [facility] today and was D/C home on PPI, pain continues.

## 2025-04-25 NOTE — ED PROVIDER NOTES
Time reflects when diagnosis was documented in both MDM as applicable and the Disposition within this note       Time User Action Codes Description Comment    4/25/2025  1:07 AM Fredi Rojas [R10.9] Abdominal pain           ED Disposition       ED Disposition   Discharge    Condition   Stable    Date/Time   Fri Apr 25, 2025  1:07 AM    Comment   Zeferino Allen discharge to home/self care.                   Assessment & Plan       Medical Decision Making  45-year-old male presenting with lower abdominal pain.  Concern for appendicitis, kidney stone, diverticulitis.  Obtain labs, EKG, CT abdomen pelvis and symptom control as needed.    Upon reassessment, patient with complete resolution of symptoms.  Discussed all results.  Follow-up with PCP and GI as needed.  Return precautions discussed.    Amount and/or Complexity of Data Reviewed  Labs: ordered.  Radiology: ordered.    Risk  Prescription drug management.             Medications   HYDROmorphone (DILAUDID) injection 1 mg (1 mg Intravenous Given 4/24/25 2204)   sodium chloride 0.9 % bolus 1,000 mL (0 mL Intravenous Stopped 4/24/25 2347)   iohexol (OMNIPAQUE) 350 MG/ML injection (MULTI-DOSE) 100 mL (100 mL Intravenous Given 4/24/25 2323)   HYDROmorphone (DILAUDID) injection 1 mg (1 mg Intravenous Given 4/25/25 0008)       ED Risk Strat Scores                    No data recorded        SBIRT 22yo+      Flowsheet Row Most Recent Value   Initial Alcohol Screen: US AUDIT-C     1. How often do you have a drink containing alcohol? 0 Filed at: 04/24/2025 2159   2. How many drinks containing alcohol do you have on a typical day you are drinking?  0 Filed at: 04/24/2025 2159   3a. Male UNDER 65: How often do you have five or more drinks on one occasion? 0 Filed at: 04/24/2025 2159   Audit-C Score 0 Filed at: 04/24/2025 2159   [place]: How many times in the past year have you...    Used an illegal drug or used a prescription medication for non-medical reasons? Never Filed  at: 04/24/2025 2159                            History of Present Illness       Chief Complaint   Patient presents with    Abdominal Pain       Past Medical History:   Diagnosis Date    Cancer (HCC)     Hodgkin's disease with nodular sclerosis, stage IVB (HCC)     Migraines     Partial hearing loss     TBI (traumatic brain injury) (HCC)       No past surgical history on file.   No family history on file.   Social History     Tobacco Use    Smoking status: Former    Smokeless tobacco: Never   Vaping Use    Vaping status: Never Used   Substance Use Topics    Alcohol use: Yes     Comment: social    Drug use: Never      E-Cigarette/Vaping    E-Cigarette Use Never User       E-Cigarette/Vaping Substances    Nicotine No     THC No     CBD No     Flavoring No     Other No     Unknown No       I have reviewed and agree with the history as documented.     45-year-old male presents for evaluation of periumbilical abdominal pain that started shortly prior to arrival.  Patient reports that he was evaluated at [facility] earlier this afternoon for epigastric abdominal pain and states an ultrasound was performed which was unremarkable per patient.  Patient states symptoms improved and then he went home, had dinner and then started with lower abdominal pain.  Denies testicular pain.  Denies vomiting, diarrhea.  Normal bowel movements.        Review of Systems   Gastrointestinal:  Positive for abdominal pain.           Objective       ED Triage Vitals   Temperature Pulse Blood Pressure Respirations SpO2 Patient Position - Orthostatic VS   04/24/25 2152 04/24/25 2152 04/24/25 2215 04/24/25 2152 04/24/25 2152 04/24/25 2215   (!) 97.4 °F (36.3 °C) 70 (!) 184/105 (!) 30 97 % Sitting      Temp Source Heart Rate Source BP Location FiO2 (%) Pain Score    04/24/25 2152 04/24/25 2215 04/24/25 2215 -- 04/24/25 2152    Temporal Monitor Right arm  10 - Worst Possible Pain      Vitals      Date and Time Temp Pulse SpO2 Resp BP Pain  Score FACES Pain Rating User   04/24/25 2215 -- 56 99 % 22 184/105 -- -- KK   04/24/25 2152 97.4 °F (36.3 °C) 70 97 % 30 -- 10 - Worst Possible Pain -- WM            Physical Exam  Vitals and nursing note reviewed.   Constitutional:       General: He is not in acute distress.     Appearance: He is well-developed.   HENT:      Head: Normocephalic and atraumatic.      Right Ear: External ear normal.      Left Ear: External ear normal.      Nose: Nose normal.   Eyes:      General: No scleral icterus.  Pulmonary:      Effort: Pulmonary effort is normal. No respiratory distress.   Abdominal:      General: There is no distension.      Palpations: Abdomen is soft.      Tenderness: There is abdominal tenderness in the periumbilical area.   Musculoskeletal:         General: No deformity. Normal range of motion.      Cervical back: Normal range of motion and neck supple.   Skin:     General: Skin is warm.      Findings: No rash.   Neurological:      General: No focal deficit present.      Mental Status: He is alert.      Gait: Gait normal.   Psychiatric:         Mood and Affect: Mood normal.         Results Reviewed       Procedure Component Value Units Date/Time    HS Troponin I 2hr [[id number]]  (Normal) Collected: 04/25/25 0053    Lab Status: Final result Specimen: Blood from Arm, Right Updated: 04/25/25 0119     hs TnI 2hr 4 ng/L      Delta 2hr hsTnI 1 ng/L     Urine Microscopic [[id number]]  (Normal) Collected: 04/25/25 0052    Lab Status: Final result Specimen: Urine, Clean Catch Updated: 04/25/25 0105     RBC, UA None Seen /hpf      WBC, UA None Seen /hpf      Epithelial Cells Occasional /hpf      Bacteria, UA None Seen /hpf     UA w Reflex to Microscopic w Reflex to Culture [[id number]]  (Abnormal) Collected: 04/25/25 0052    Lab Status: Final result Specimen: Urine, Clean Catch Updated: 04/25/25 0105     Color, UA Yellow     Clarity, UA Clear     Specific Gravity, UA 1.010     pH, UA 6.0     Leukocytes, UA Negative      Nitrite, UA Negative     Protein, UA Trace mg/dl      Glucose, UA Negative mg/dl      Ketones, UA Negative mg/dl      Urobilinogen, UA <2.0 mg/dl      Bilirubin, UA Negative     Occult Blood, UA Negative    HS Troponin I 4hr [[id number]]     Lab Status: No result Specimen: Blood     HS Troponin 0hr (reflex protocol) [[id number]]  (Normal) Collected: 04/24/25 2201    Lab Status: Final result Specimen: Blood from Arm, Right Updated: 04/24/25 2228     hs TnI 0hr 3 ng/L     CMP [[id number]] Collected: 04/24/25 2201    Lab Status: Final result Specimen: Blood from Arm, Right Updated: 04/24/25 2224     Sodium 138 mmol/L      Potassium 3.9 mmol/L      Chloride 103 mmol/L      CO2 27 mmol/L      ANION GAP 8 mmol/L      BUN 16 mg/dL      Creatinine 0.97 mg/dL      Glucose 109 mg/dL      Calcium 9.8 mg/dL      AST 17 U/L      ALT 31 U/L      Alkaline Phosphatase 84 U/L      Total Protein 7.5 g/dL      Albumin 4.9 g/dL      Total Bilirubin 0.55 mg/dL      eGFR 94 ml/min/1.73sq m     Narrative:      National Kidney Disease Foundation guidelines for Chronic Kidney Disease (CKD):     Stage 1 with normal or high GFR (GFR > 90 mL/min/1.73 square meters)    Stage 2 Mild CKD (GFR = 60-89 mL/min/1.73 square meters)    Stage 3A Moderate CKD (GFR = 45-59 mL/min/1.73 square meters)    Stage 3B Moderate CKD (GFR = 30-44 mL/min/1.73 square meters)    Stage 4 Severe CKD (GFR = 15-29 mL/min/1.73 square meters)    Stage 5 End Stage CKD (GFR <15 mL/min/1.73 square meters)  Note: GFR calculation is accurate only with a steady state creatinine    Lipase [[id number]]  (Normal) Collected: 04/24/25 2201    Lab Status: Final result Specimen: Blood from Arm, Right Updated: 04/24/25 2224     Lipase 43 u/L     CBC and differential [[id number]]  (Abnormal) Collected: 04/24/25 2201    Lab Status: Final result Specimen: Blood from Arm, Left Updated: 04/24/25 2206     WBC 6.60 Thousand/uL      RBC 5.66 Million/uL      Hemoglobin 15.8 g/dL      Hematocrit 46.7 %       MCV 83 fL      MCH 27.9 pg      MCHC 33.8 g/dL      RDW 12.1 %      MPV 9.8 fL      Platelets 244 Thousands/uL      nRBC 0 /100 WBCs      Segmented % 52 %      Immature Grans % 1 %      Lymphocytes % 38 %      Monocytes % 7 %      Eosinophils Relative 2 %      Basophils Relative 0 %      Absolute Neutrophils 3.43 Thousands/µL      Absolute Immature Grans 0.03 Thousand/uL      Absolute Lymphocytes 2.51 Thousands/µL      Absolute Monocytes 0.47 Thousand/µL      Eosinophils Absolute 0.14 Thousand/µL      Basophils Absolute 0.02 Thousands/µL             CT Abdomen pelvis with contrast   Final Interpretation by Ralph Link MD (04/25 0057)      Normal appendix. No evidence of bowel obstruction, colitis or diverticulitis.         Workstation performed: [id number]             Procedures    ED Medication and Procedure Management   Prior to Admission Medications   Prescriptions Last Dose Informant Patient Reported? Taking?   Famotidine, PF, (PEPCID) 20 mg/2 mL SOLN   Yes No   Sig: Every 12 hours   Patient not taking: Reported on 5/23/2022   Ibuprofen 800 MG/200ML SOLN   Yes No   Sig: 3 (three) times a day   Patient not taking: Reported on 5/23/2022   Ibuprofen-Famotidine 800-26.6 MG TABS   Yes No   Sig: 3 (three) times a day   Patient not taking: Reported on 5/23/2022   TiZANidine (ZANAFLEX) 4 MG capsule   No No   Sig: Take 1 capsule (4 mg total) by mouth 3 (three) times a day   Patient not taking: Reported on 5/23/2022   rizatriptan (MAXALT-MLT) 10 MG disintegrating tablet   Yes No   Sig: Take 10 mg by mouth once as needed for migraine May repeat in 2 hours if needed   traZODone (DESYREL) 50 mg tablet   Yes No   Sig: TAKE 1 TABLET BY MOUTH EVERY DAY AT BEDTIME AS NEEDED FOR 30 DAYS   Patient not taking: Reported on 5/23/2022      Facility-Administered Medications: None     Patient's Medications   Discharge Prescriptions    No medications on file     No discharge procedures on file.  ED SEPSIS DOCUMENTATION    Time reflects when diagnosis was documented in both MDM as applicable and the Disposition within this note       Time User Action Codes Description Comment    4/25/2025  1:07 AM Fredi Rojas Add [R10.9] Abdominal pain                  Fredi Rojas DO  04/25/25 0122

## 2025-04-28 LAB
ATRIAL RATE: 288 BPM
ATRIAL RATE: 48 BPM
ATRIAL RATE: 52 BPM
P AXIS: 40 DEGREES
PR INTERVAL: 194 MS
QRS AXIS: 45 DEGREES
QRS AXIS: 55 DEGREES
QRS AXIS: 6 DEGREES
QRSD INTERVAL: 100 MS
QRSD INTERVAL: 100 MS
QRSD INTERVAL: 92 MS
QT INTERVAL: 392 MS
QT INTERVAL: 430 MS
QT INTERVAL: 440 MS
QTC INTERVAL: 380 MS
QTC INTERVAL: 393 MS
QTC INTERVAL: 435 MS
T WAVE AXIS: 34 DEGREES
T WAVE AXIS: 53 DEGREES
T WAVE AXIS: 60 DEGREES
VENTRICULAR RATE: 47 BPM
VENTRICULAR RATE: 48 BPM
VENTRICULAR RATE: 74 BPM

## 2025-04-28 PROCEDURE — 93010 ELECTROCARDIOGRAM REPORT: CPT | Performed by: INTERNAL MEDICINE

## 2025-05-06 ENCOUNTER — OFFICE VISIT (OUTPATIENT)
Dept: GASTROENTEROLOGY | Facility: CLINIC | Age: 45
End: 2025-05-06
Payer: COMMERCIAL

## 2025-05-06 ENCOUNTER — TELEPHONE (OUTPATIENT)
Dept: GASTROENTEROLOGY | Facility: CLINIC | Age: 45
End: 2025-05-06

## 2025-05-06 VITALS
WEIGHT: 243 LBS | SYSTOLIC BLOOD PRESSURE: 124 MMHG | HEIGHT: 74 IN | DIASTOLIC BLOOD PRESSURE: 88 MMHG | BODY MASS INDEX: 31.18 KG/M2

## 2025-05-06 DIAGNOSIS — Z12.11 COLON CANCER SCREENING: ICD-10-CM

## 2025-05-06 DIAGNOSIS — Z83.719 FAMILY HISTORY OF COLONIC POLYPS: ICD-10-CM

## 2025-05-06 DIAGNOSIS — R10.10 UPPER ABDOMINAL PAIN: Primary | ICD-10-CM

## 2025-05-06 PROCEDURE — 99204 OFFICE O/P NEW MOD 45 MIN: CPT | Performed by: PHYSICIAN ASSISTANT

## 2025-05-06 RX ORDER — POLYETHYLENE GLYCOL 3350 17 G/17G
238 POWDER, FOR SOLUTION ORAL ONCE
Qty: 238 G | Refills: 0 | Status: SHIPPED | OUTPATIENT
Start: 2025-05-06 | End: 2025-05-14 | Stop reason: HOSPADM

## 2025-05-06 RX ORDER — SODIUM CHLORIDE, SODIUM LACTATE, POTASSIUM CHLORIDE, CALCIUM CHLORIDE 600; 310; 30; 20 MG/100ML; MG/100ML; MG/100ML; MG/100ML
125 INJECTION, SOLUTION INTRAVENOUS CONTINUOUS
Status: CANCELLED | OUTPATIENT
Start: 2025-05-06

## 2025-05-06 NOTE — PROGRESS NOTES
Name: Zeferino Allen      : 1980      MRN: [id number]  Encounter Provider: Gala Mane PA-C  Encounter Date: 2025   Encounter department: [facility]  :  Assessment & Plan  Upper abdominal pain  Had severe upper abdominal pain and nausea with negative ER evaluation including labs CT and ultrasound.  Pain improved and now occurs mildly postprandially. I did recommend an endoscopy to evaluate for esophagogastroduodenitis, peptic ulcer disease, H. pylori, etc. I reviewed indications, risks, benefits and alternatives on endoscopy and pt is willing to proceed.  In the interim patient advised to follow bland antireflux diet.  Will hold off on medications pending endoscopy results.  He knows to call with new or worsening issues.    Orders:    EGD; Future    Colon cancer screening  I did recommend a colonoscopy for colorectal cancer screening given his FH of colon polyps in 1st degree relative (F). I reviewed the indications, risks, benefits and alternatives of a colonoscopy and the patient is willing to proceed.     Orders:    Colonoscopy; Future    polyethylene glycol (MiraLax) 17 GM/SCOOP powder; Take 238 g by mouth once for 1 dose Take 238 g my mouth. Use as directed    Family history of colonic polyps  As above  Orders:    Colonoscopy; Future    polyethylene glycol (MiraLax) 17 GM/SCOOP powder; Take 238 g by mouth once for 1 dose Take 238 g my mouth. Use as directed  Follow up: EGD/COLON      History of Present Illness   HPI  Zeferino Allen is a 45 y.o. male With PMH Hodgkin's lymphoma S/P chemo , migraines, TBI, PTSD who presents at the kind request of Dr. Desir for evaluation of abdominal pain and for colon screening.  Never had endoscopy or colonoscopy.  No family h/o GI malignancy but father had polyps at age 55.    Was at [facility] 2025 for upper abdominal pain, nausea  improved with Pepcid labs normal discharged on Protonix and advised to  "see GI.    4/2025 CBC, CMP, lipase normal   RUQ US normal.   CT abdomen pelvis with contrast 4/2025 showed no acute disease.    Patient states he had 2 days of 10/10 upper abdominal pain without radiation could not identify any aggravating or relieving factors.  This has improved and now admits to intermittent nonradiating upper abdominal discomfort that can occur after eating.  Took the Protonix for 3 days but believes this caused nausea so stopped.  Admits to daily bowel movements without bleeding.  Eating well and weight stable.  Denies reflux, uses NSAIDs rarely.  Otherwise denies all other GI and constitutional symptoms.      Review of Systems  Constitutional: denies fatigue, fever.  HEENT: denies visual disturbance, postnasal drip, sore throat.  Respiratory: denies cough, shortness of breath.  Cardiovascular: denies chest pain, leg swelling.  Gastrointestinal: as noted above in HPI.  : denies difficulty urinating, dysuria.  Musculoskeletal: denies arthralgias, back pain.  Neurological: denies dizziness, syncope.  Psychiatric: denies confusion, anxiety.       Objective   /88   Ht 6' 2\" (1.88 m)   Wt 110 kg (243 lb)   BMI 31.20 kg/m²      Physical Exam  Constitutional: Well-developed, no acute distress  HEENT: normocephalic, mucous membranes moist.  Neck: Supple  Skin: warm and dry  Respiratory: Lungs are clear to auscultation B/L.  Cardiovascular: Heart is regular rate and rhythm.  Gastrointestinal: Soft, nontender, nondistended with normal active bowel sounds.  No masses, guarding, rebound.   Rectal Exam: Deferred.  Extremities: No edema.  Neurologic: Nonfocal. A & O ×3.   Psychiatric: Normal affect.    "

## 2025-05-06 NOTE — TELEPHONE ENCOUNTER
Scheduled date of combo (as of today):5/14/25  Physician performing combo:shin  Location of colonoscopy:[facility]  Bowel prep reviewed with patient:zelda/sarbjit batres  Instructions reviewed with patient by:  Clearances: N

## 2025-05-08 ENCOUNTER — ANESTHESIA EVENT (OUTPATIENT)
Dept: ANESTHESIOLOGY | Facility: AMBULATORY SURGERY CENTER | Age: 45
End: 2025-05-08

## 2025-05-08 ENCOUNTER — ANESTHESIA (OUTPATIENT)
Dept: ANESTHESIOLOGY | Facility: AMBULATORY SURGERY CENTER | Age: 45
End: 2025-05-08

## 2025-05-14 ENCOUNTER — ANESTHESIA (OUTPATIENT)
Dept: GASTROENTEROLOGY | Facility: AMBULATORY SURGERY CENTER | Age: 45
End: 2025-05-14

## 2025-05-14 ENCOUNTER — ANESTHESIA EVENT (OUTPATIENT)
Dept: GASTROENTEROLOGY | Facility: AMBULATORY SURGERY CENTER | Age: 45
End: 2025-05-14

## 2025-05-14 ENCOUNTER — HOSPITAL ENCOUNTER (OUTPATIENT)
Dept: GASTROENTEROLOGY | Facility: AMBULATORY SURGERY CENTER | Age: 45
Discharge: HOME | End: 2025-05-14
Attending: PHYSICIAN ASSISTANT
Payer: COMMERCIAL

## 2025-05-14 VITALS
RESPIRATION RATE: 17 BRPM | WEIGHT: 230 LBS | HEIGHT: 74 IN | SYSTOLIC BLOOD PRESSURE: 114 MMHG | OXYGEN SATURATION: 97 % | HEART RATE: 66 BPM | BODY MASS INDEX: 29.52 KG/M2 | TEMPERATURE: 97.6 F | DIASTOLIC BLOOD PRESSURE: 68 MMHG

## 2025-05-14 DIAGNOSIS — Z12.11 COLON CANCER SCREENING: ICD-10-CM

## 2025-05-14 DIAGNOSIS — R10.10 UPPER ABDOMINAL PAIN: ICD-10-CM

## 2025-05-14 DIAGNOSIS — Z83.719 FAMILY HISTORY OF COLONIC POLYPS: ICD-10-CM

## 2025-05-14 PROCEDURE — 88305 TISSUE EXAM BY PATHOLOGIST: CPT | Performed by: STUDENT IN AN ORGANIZED HEALTH CARE EDUCATION/TRAINING PROGRAM

## 2025-05-14 PROCEDURE — G0121 COLON CA SCRN NOT HI RSK IND: HCPCS | Performed by: INTERNAL MEDICINE

## 2025-05-14 PROCEDURE — 43239 EGD BIOPSY SINGLE/MULTIPLE: CPT | Performed by: INTERNAL MEDICINE

## 2025-05-14 RX ORDER — SODIUM CHLORIDE, SODIUM LACTATE, POTASSIUM CHLORIDE, CALCIUM CHLORIDE 600; 310; 30; 20 MG/100ML; MG/100ML; MG/100ML; MG/100ML
INJECTION, SOLUTION INTRAVENOUS CONTINUOUS PRN
Status: DISCONTINUED | OUTPATIENT
Start: 2025-05-14 | End: 2025-05-14

## 2025-05-14 RX ORDER — PROPOFOL 10 MG/ML
INJECTION, EMULSION INTRAVENOUS AS NEEDED
Status: DISCONTINUED | OUTPATIENT
Start: 2025-05-14 | End: 2025-05-14

## 2025-05-14 RX ORDER — SODIUM CHLORIDE, SODIUM LACTATE, POTASSIUM CHLORIDE, CALCIUM CHLORIDE 600; 310; 30; 20 MG/100ML; MG/100ML; MG/100ML; MG/100ML
125 INJECTION, SOLUTION INTRAVENOUS CONTINUOUS
Status: DISCONTINUED | OUTPATIENT
Start: 2025-05-14 | End: 2025-05-18 | Stop reason: HOSPADM

## 2025-05-14 RX ADMIN — SODIUM CHLORIDE, SODIUM LACTATE, POTASSIUM CHLORIDE, CALCIUM CHLORIDE: 600; 310; 30; 20 INJECTION, SOLUTION INTRAVENOUS at 11:55

## 2025-05-14 RX ADMIN — PROPOFOL 50 MG: 10 INJECTION, EMULSION INTRAVENOUS at 12:11

## 2025-05-14 RX ADMIN — PROPOFOL 50 MG: 10 INJECTION, EMULSION INTRAVENOUS at 12:14

## 2025-05-14 RX ADMIN — PROPOFOL 50 MG: 10 INJECTION, EMULSION INTRAVENOUS at 12:16

## 2025-05-14 RX ADMIN — PROPOFOL 150 MG: 10 INJECTION, EMULSION INTRAVENOUS at 12:09

## 2025-05-14 RX ADMIN — SODIUM CHLORIDE, SODIUM LACTATE, POTASSIUM CHLORIDE, CALCIUM CHLORIDE 125 ML/HR: 600; 310; 30; 20 INJECTION, SOLUTION INTRAVENOUS at 11:46

## 2025-05-14 RX ADMIN — PROPOFOL 50 MG: 10 INJECTION, EMULSION INTRAVENOUS at 12:24

## 2025-05-14 RX ADMIN — PROPOFOL 50 MG: 10 INJECTION, EMULSION INTRAVENOUS at 12:21

## 2025-05-14 RX ADMIN — PROPOFOL 50 MG: 10 INJECTION, EMULSION INTRAVENOUS at 12:19

## 2025-05-14 RX ADMIN — SODIUM CHLORIDE, SODIUM LACTATE, POTASSIUM CHLORIDE, CALCIUM CHLORIDE: 600; 310; 30; 20 INJECTION, SOLUTION INTRAVENOUS at 12:33

## 2025-05-14 NOTE — H&P
"Abd pHistory and Physical - [facility]    Zeferino Allen 45 y.o. male MRN: [id number]      HPI: Zeferino Allen is a 45 y.o. male who presents for abd pain, fhx colon polyps in father.     Allergies[1]      REVIEW OF SYSTEMS: Per the HPI, and otherwise unremarkable.    Historical Information     Past Medical History:   Diagnosis Date    Cancer (HCC)     Hodgkin's disease with nodular sclerosis, stage IVB (HCC)     Migraines     Partial hearing loss     TBI (traumatic brain injury) (HCC)      Past Surgical History:   Procedure Laterality Date    DEEP NECK LYMPH NODE BIOPSY / EXCISION      NOSE SURGERY       Social History   Social History     Substance and Sexual Activity   Alcohol Use Yes    Comment: social     Social History     Substance and Sexual Activity   Drug Use Never     Tobacco Use History[2]  Family History   Problem Relation Age of Onset    Colon polyps Father     Colon cancer Neg Hx        Meds/Allergies     Current Medications[3]        Objective     /81   Pulse 66   Temp 97.6 °F (36.4 °C) (Temporal)   Resp 18   Ht 6' 2\" (1.88 m)   Wt 104 kg (230 lb)   SpO2 96%   BMI 29.53 kg/m²       PHYSICAL EXAM    Gen: NAD AAOx3  Head: Normocephalic, Atraumatic  CV: S1S2 RRR no m/r/g  CHEST: Clear b/l no c/r/w  ABD: soft, +BS NT/ND no masses  EXT: no edema      ASSESSMENT/PLAN:  This is a 45 y.o. year old male here for egd/colon, and he is stable and optimized for his procedure.             [1] No Known Allergies  [2]   Social History  Tobacco Use   Smoking Status Former   Smokeless Tobacco Never   [3]   Current Outpatient Medications:     polyethylene glycol (MiraLax) 17 GM/SCOOP powder    rizatriptan (MAXALT-MLT) 10 MG disintegrating tablet    Current Facility-Administered Medications:     lactated ringers infusion, 125 mL/hr, Intravenous, Continuous, 125 mL/hr at 05/14/25 1146    Facility-Administered Medications Ordered in Other Encounters:     lactated " ringers infusion, , Intravenous, Continuous PRN, New Bag at 05/14/25 3319

## 2025-05-14 NOTE — ANESTHESIA PREPROCEDURE EVALUATION
Procedure:  COLONOSCOPY  EGD    Relevant Problems   CARDIO   (+) Migraine      HEMATOLOGY   (+) Hodgkin lymphoma, nodular sclerosis (HCC)      NEURO/PSYCH   (+) Migraine   (+) Posttraumatic stress disorder    H/o TBI and PTSD from war    Physical Exam    Airway     Mallampati score: II  TM Distance: >3 FB  Neck ROM: full  Mouth opening: >= 4 cm      Cardiovascular      Dental   Comment: None loose, No notable dental hx     Pulmonary      Neurological    He appears awake and alert.      Other Findings        Anesthesia Plan  ASA Score- 2     Anesthesia Type- MAC with ASA Monitors.         Additional Monitors:     Airway Plan: natural airway.    Comment: 08:00 - last of PO bowel prep    Patient educated on the possibility for awareness under sedation and of the possibility of airway intervention in the event of an airway or procedural emergency  .       Plan Factors-Exercise tolerance (METS): >4 METS.    Chart reviewed.    Patient summary reviewed.    Patient is not a current smoker.              Induction-     Postoperative Plan- .   Monitoring Plan - Monitoring plan - standard ASA monitoring      Perioperative Resuscitation Plan - Level 1 - Full Code.       Informed Consent- Anesthetic plan and risks discussed with patient.  I personally reviewed this patient with the CRNA. Discussed and agreed on the Anesthesia Plan with the CRNA..      NPO Status:  Vitals Value Taken Time   Date of last liquid 05/14/25 05/14/25 11:15   Time of last liquid 0800 05/14/25 11:15   Date of last solid 05/12/25 05/14/25 11:15   Time of last solid 1900 05/14/25 11:15

## 2025-05-19 PROCEDURE — 88305 TISSUE EXAM BY PATHOLOGIST: CPT | Performed by: STUDENT IN AN ORGANIZED HEALTH CARE EDUCATION/TRAINING PROGRAM

## 2025-05-23 ENCOUNTER — RESULTS FOLLOW-UP (OUTPATIENT)
Dept: GASTROENTEROLOGY | Facility: CLINIC | Age: 45
End: 2025-05-23